# Patient Record
Sex: MALE | Race: WHITE | NOT HISPANIC OR LATINO | URBAN - METROPOLITAN AREA
[De-identification: names, ages, dates, MRNs, and addresses within clinical notes are randomized per-mention and may not be internally consistent; named-entity substitution may affect disease eponyms.]

---

## 2017-01-12 ENCOUNTER — AMBULATORY - HEALTHEAST (OUTPATIENT)
Dept: NURSING | Facility: CLINIC | Age: 32
End: 2017-01-12

## 2017-01-12 DIAGNOSIS — R50.9 FEVER: ICD-10-CM

## 2017-02-13 ENCOUNTER — COMMUNICATION - HEALTHEAST (OUTPATIENT)
Dept: HEALTH INFORMATION MANAGEMENT | Facility: CLINIC | Age: 32
End: 2017-02-13

## 2017-03-24 ENCOUNTER — OFFICE VISIT - HEALTHEAST (OUTPATIENT)
Dept: FAMILY MEDICINE | Facility: CLINIC | Age: 32
End: 2017-03-24

## 2017-03-24 DIAGNOSIS — J01.90 ACUTE SINUSITIS: ICD-10-CM

## 2017-11-06 ENCOUNTER — RECORDS - HEALTHEAST (OUTPATIENT)
Dept: ADMINISTRATIVE | Facility: OTHER | Age: 32
End: 2017-11-06

## 2018-05-07 ENCOUNTER — OFFICE VISIT - HEALTHEAST (OUTPATIENT)
Dept: FAMILY MEDICINE | Facility: CLINIC | Age: 33
End: 2018-05-07

## 2018-05-07 DIAGNOSIS — H00.015 HORDEOLUM EXTERNUM OF LEFT LOWER EYELID: ICD-10-CM

## 2019-01-31 ENCOUNTER — RECORDS - HEALTHEAST (OUTPATIENT)
Dept: ADMINISTRATIVE | Facility: OTHER | Age: 34
End: 2019-01-31

## 2019-03-25 ENCOUNTER — OFFICE VISIT - HEALTHEAST (OUTPATIENT)
Dept: FAMILY MEDICINE | Facility: CLINIC | Age: 34
End: 2019-03-25

## 2019-03-25 DIAGNOSIS — J02.0 ACUTE STREPTOCOCCAL PHARYNGITIS: ICD-10-CM

## 2019-03-25 LAB — DEPRECATED S PYO AG THROAT QL EIA: ABNORMAL

## 2020-08-16 ENCOUNTER — OFFICE VISIT - HEALTHEAST (OUTPATIENT)
Dept: FAMILY MEDICINE | Facility: CLINIC | Age: 35
End: 2020-08-16

## 2020-08-16 DIAGNOSIS — Z20.818 EXPOSURE TO STREP THROAT: ICD-10-CM

## 2020-08-16 LAB — DEPRECATED S PYO AG THROAT QL EIA: NORMAL

## 2020-08-17 ENCOUNTER — COMMUNICATION - HEALTHEAST (OUTPATIENT)
Dept: FAMILY MEDICINE | Facility: CLINIC | Age: 35
End: 2020-08-17

## 2020-08-17 DIAGNOSIS — J02.0 ACUTE STREPTOCOCCAL PHARYNGITIS: ICD-10-CM

## 2020-08-17 LAB — GROUP A STREP BY PCR: ABNORMAL

## 2020-11-30 ENCOUNTER — OFFICE VISIT - HEALTHEAST (OUTPATIENT)
Dept: FAMILY MEDICINE | Facility: CLINIC | Age: 35
End: 2020-11-30

## 2020-11-30 DIAGNOSIS — J02.9 SORE THROAT: ICD-10-CM

## 2020-11-30 LAB — DEPRECATED S PYO AG THROAT QL EIA: NORMAL

## 2020-12-01 ENCOUNTER — COMMUNICATION - HEALTHEAST (OUTPATIENT)
Dept: SCHEDULING | Facility: CLINIC | Age: 35
End: 2020-12-01

## 2020-12-01 LAB — GROUP A STREP BY PCR: NORMAL

## 2021-05-27 VITALS
OXYGEN SATURATION: 96 % | HEART RATE: 65 BPM | DIASTOLIC BLOOD PRESSURE: 70 MMHG | RESPIRATION RATE: 12 BRPM | SYSTOLIC BLOOD PRESSURE: 120 MMHG | TEMPERATURE: 98.3 F

## 2021-05-27 NOTE — PROGRESS NOTES
OFFICE VISIT - FAMILY MEDICINE     ASSESSMENT AND PLAN     1. Acute streptococcal pharyngitis  Rapid Strep A Screen-Throat    azithromycin (ZITHROMAX Z-NADER) 250 MG tablet   Continue with good hydration, extra vitamin C, gargle with salt and water as needed, take the prescribed antibiotic as directed, possible side effect discussed.  Return if not improving.      CHIEF COMPLAINT   Sore Throat (children had strep throat; exposure)    HPI   El Beckett is a 34 y.o. male.  No Patient Care Coordination Note on file.  2 kids at home has been currently being treated for strep infection, patient woke up this morning with sore throat, sensation of swollen gland in the neck, denies any fever chills, slight dry cough.  Has not tried any specific treatment.    Review of Systems As per HPI, otherwise negative.    OBJECTIVE   /40 (Patient Site: Right Arm, Patient Position: Sitting, Cuff Size: Adult Regular)   Pulse 74   Wt 170 lb 8 oz (77.3 kg)   SpO2 100%   Physical Exam   Constitutional: He is oriented to person, place, and time. He appears well-developed and well-nourished.   HENT:   Head: Normocephalic and atraumatic.   Right Ear: External ear normal.   Left Ear: External ear normal.   Mild pharyngeal erythema, 1+ tonsils bilaterally.   Neck: Normal range of motion. Neck supple. No JVD present. No tracheal deviation present. No thyromegaly present.   Cardiovascular: Normal rate, regular rhythm, normal heart sounds and intact distal pulses. Exam reveals no gallop and no friction rub.   No murmur heard.  Pulmonary/Chest: Effort normal and breath sounds normal. No respiratory distress. He has no wheezes. He has no rales.   Musculoskeletal: He exhibits no edema or tenderness.   Lymphadenopathy:     He has no cervical adenopathy.   Neurological: He is alert and oriented to person, place, and time. Coordination normal.   Psychiatric: He has a normal mood and affect. Judgment and thought content normal.       PFSH   No  family history on file.  Social History     Socioeconomic History     Marital status:      Spouse name: Not on file     Number of children: 1     Years of education: Not on file     Highest education level: Not on file   Occupational History     Not on file   Social Needs     Financial resource strain: Not on file     Food insecurity:     Worry: Not on file     Inability: Not on file     Transportation needs:     Medical: Not on file     Non-medical: Not on file   Tobacco Use     Smoking status: Never Smoker     Smokeless tobacco: Never Used   Substance and Sexual Activity     Alcohol use: Not on file     Drug use: Not on file     Sexual activity: Not on file   Lifestyle     Physical activity:     Days per week: Not on file     Minutes per session: Not on file     Stress: Not on file   Relationships     Social connections:     Talks on phone: Not on file     Gets together: Not on file     Attends Yazidism service: Not on file     Active member of club or organization: Not on file     Attends meetings of clubs or organizations: Not on file     Relationship status: Not on file     Intimate partner violence:     Fear of current or ex partner: Not on file     Emotionally abused: Not on file     Physically abused: Not on file     Forced sexual activity: Not on file   Other Topics Concern     Not on file   Social History Narrative     Not on file     Relevant history was reviewed with the patient today, unless noted in HPI, nothing is pertinent for this visit.  [facility]     Patient Active Problem List    Diagnosis Date Noted     Inguinal Hernia      Overview Note:     Created by Conversion    Replacement Utility updated for latest IMO load       Ulcerative Proctitis      Overview Note:     Created by Conversion         Past Surgical History:   Procedure Laterality Date     VT COLONOSCOPY FLX DX W/COLLJ SPEC WHEN PFRMD      Description: Complete Colonoscopy;  Recorded: 03/18/2014;       RESULTS/CONSULTS (Lab/Rad)      Recent Results (from the past 168 hour(s))   Rapid Strep A Screen-Throat   Result Value Ref Range    Rapid Strep A Antigen Group A Strep detected (!) No Group A Strep detected, presumptive negative     No results found.  MEDICATIONS     Current Outpatient Medications on File Prior to Visit   Medication Sig Dispense Refill     betamethasone dipropionate (DIPROLENE) 0.05 % cream APPLY TO AFFECTED AREA TWICE A DAY  3     CANASA 1,000 mg suppository INSERT 1 SUPPOSITORY BY RECTAL ROUTE EVERY DAY AT BEDTIME  11     No current facility-administered medications on file prior to visit.        HEALTH MAINTENANCE / SCREENING   PHQ-2 Total Score: 0 (3/25/2019  9:42 AM)  , No Data Recorded,No Data Recorded  Immunization History   Administered Date(s) Administered     DTaP, historic 10/06/1987, 04/04/1989     Hep B, historic 09/20/2001, 11/15/2001, 03/14/2002     IPV 04/07/1987, 04/04/1989     MMR 10/15/1996     Health Maintenance   Topic     TD 18+ HE      TDAP ADULT ONE TIME DOSE      INFLUENZA VACCINE RULE BASED (1)     ADVANCE DIRECTIVES DISCUSSED WITH PATIENT        [place] Burnett MD  Family Medicine, [facility]     This note was dictated using a voice recognition software.  Any grammatical or context distortion are unintentional and inherent to the software.

## 2021-05-30 VITALS — WEIGHT: 178 LBS

## 2021-06-01 VITALS — WEIGHT: 183.25 LBS

## 2021-06-02 VITALS — WEIGHT: 170.5 LBS

## 2021-06-04 VITALS
WEIGHT: 174.56 LBS | RESPIRATION RATE: 20 BRPM | TEMPERATURE: 98.1 F | SYSTOLIC BLOOD PRESSURE: 117 MMHG | OXYGEN SATURATION: 96 % | DIASTOLIC BLOOD PRESSURE: 65 MMHG | HEART RATE: 74 BPM

## 2021-06-09 NOTE — PROGRESS NOTES
Sinus  One wk sneeze then nose blow lot then clear then 2 days ago changed and malar pressure and teeth pain and slime from [employer] constant.  Last night worst.  Massive pressure pain face.  Bought afrin.   Can breathe but pain and pressure.    No fever.    Ulcerative proctitis with variable control with suppository of canasa...   Back on after trial off.        ROS: as noted above    OBJECTIVE:   Vitals:    03/24/17 1042   BP: 124/62   Pulse: 68   Resp: 20   Temp: 98.8  F (37.1  C)      Head: atraumatic   Eyes: nl eom, anicteric   Ears: nl external ears tms  Neck: nl nodes, supple   Lungs: clear to ausc   Heart: regular rhythm  Back: no tenderness  Abd: soft nontender   Joints: uninflamed   Ext: nontender calves   Mental: euthymic  Neuro: no weakness  Gait: normal  Bilateral sinus tenderness    ASSESSMENT/PLAN:    1. Acute sinusitis  azithromycin (ZITHROMAX) 250 MG tablet    fluticasone (FLONASE) 50 mcg/actuation nasal spray     Anticipate resolution otherwise return.  Return sooner if symptoms worsen.  More than 10 of fifteen total minutes time spent education counseling regarding the issues and care of same as listed in the assessment and plan of this note

## 2021-06-10 NOTE — TELEPHONE ENCOUNTER
Called patient.  Left him a voicemail requesting that he call back through [facility] at his earliest convenience.  When patient returns my call, he needs to be informed of the followin.  Reflex strep testing has returned positive.  2.  A prescription for azithromycin has been sent to [facility].  3.  Recommend use of a probiotic while taking this antibiotic.  4.  He is considered contagious for 24 hours after starting the azithromycin.  5.  He should discard his toothbrush 48 hours after starting the azithromycin to prevent reinfection.    Michael Huddleston MD 20 2:29 PM

## 2021-06-10 NOTE — PROGRESS NOTES
"Walk In Care Note                                                        Date of Visit: 8/16/2020     Chief Complaint   El Beckett is a(n) 35 y.o. White or  male who presents to [facility] with the following complaint(s):  Exposure to strep (son is positive for strep and neg for covid)       Assessment and Plan   1. Exposure to strep throat  - Rapid Strep A Screen-Throat  - Group A Strep, RNA Direct Detection, Throat      Strep screen is negative. Reflex strep testing is in process; will prescribe azithromycin if positive.     Counseled patient regarding assessment and plan for evaluation and treatment. Questions were answered. See AVS for the specific written instructions that were provided at the conclusion of the visit.     Discussed signs / symptoms that warrant urgent / emergent medical attention.     Follow up as needed.      History of Present Illness   Primary symptom: Sore Throat  Onset: Several days ago  Progression: Intermittent  Fevers: No  Chills: No  Sore throat: \"A twinge\"  Dysgeusia: No  Anosmia: No  Nasal congestion: No  Rhinorrhea: No  Sinus pain / pressure: No  Ear pain: No  Headache: No  Body aches: No  Cough: No  Shortness of breath: No  GI symptoms: None  Rash: No  Additional symptoms: None  Home therapies utilized: None  Underlying lung disease: No  Exposure to strep: Yes, son tested positive on 8/14/2020.   Exposure to influenza: No  Exposure to COVID-19: No; son tested negative within the past week.   Other ill contacts: None  Recent travel: No     Review of Systems   Review of Systems   All other systems reviewed and are negative.       Physical Exam   Vitals:    08/16/20 1317   BP: 117/65   Patient Site: Right Arm   Patient Position: Sitting   Cuff Size: Adult Regular   Pulse: 74   Resp: 20   Temp: 98.1  F (36.7  C)   TempSrc: Oral   SpO2: 96%   Weight: 174 lb 9 oz (79.2 kg)     Physical Exam  Vitals signs and nursing note reviewed.   Constitutional:       General: He is not " in acute distress.     Appearance: He is well-developed and normal weight. He is not ill-appearing or toxic-appearing.   HENT:      Head: Normocephalic and atraumatic.      Right Ear: Tympanic membrane, ear canal and external ear normal.      Left Ear: Tympanic membrane, ear canal and external ear normal.      Nose: No mucosal edema or rhinorrhea.      Mouth/Throat:      Mouth: Mucous membranes are moist. No oral lesions.      Pharynx: Uvula midline. No oropharyngeal exudate or posterior oropharyngeal erythema.   Eyes:      General: Lids are normal.      Conjunctiva/sclera: Conjunctivae normal.   Neck:      Musculoskeletal: Neck supple. No edema or erythema.   Cardiovascular:      Rate and Rhythm: Normal rate and regular rhythm.      Heart sounds: S1 normal and S2 normal. No murmur. No friction rub. No gallop.    Pulmonary:      Effort: Pulmonary effort is normal.      Breath sounds: Normal breath sounds. No stridor. No wheezing, rhonchi or rales.   Lymphadenopathy:      Cervical: No cervical adenopathy.   Skin:     General: Skin is warm and dry.      Coloration: Skin is not pale.      Findings: No rash.   Neurological:      General: No focal deficit present.      Mental Status: He is alert and oriented to person, place, and time.          Diagnostic Studies   Laboratory:  Results for orders placed or performed in visit on 08/16/20   Rapid Strep A Screen-Throat    Specimen: Throat   Result Value Ref Range    Rapid Strep A Antigen No Group A Strep detected, presumptive negative No Group A Strep detected, presumptive negative     Radiology:  N/A    Electrocardiogram:  N/A     Procedure Note   N/A     Pertinent History   The following portions of the patient's history were reviewed and updated as appropriate: allergies, current medications, past family history, past medical history, past social history, past surgical history and problem list.    Patient has Inguinal Hernia and Ulcerative Proctitis on their problem  list.    Patient has no past medical history on file.    Patient has a past surgical history that includes pr colonoscopy flx dx w/collj spec when pfrmd.    Patient's family history is not on file.    Patient reports that he has never smoked. He has never used smokeless tobacco.     Portions of this note have been dictated using voice recognition software. Any grammatical or contextual distortions are unintentional and inherent to the software.    Michael Huddleston MD  [facility]

## 2021-06-10 NOTE — PATIENT INSTRUCTIONS - HE
-Rapid strep test is negative.  A confirmatory strep test is in process and will be finalized tomorrow.  -We will only reach out to you if the confirmatory strep test is positive.  An antibiotic will be prescribed if this test is positive.  -Recommend rest, hydration, and alternating doses of over the counter acetaminophen and ibuprofen as needed to manage fever and discomfort.

## 2021-06-13 NOTE — PROGRESS NOTES
OUTPATIENT VISIT NOTE                                                   Date of Visit: 11/30/2020     Chief Complaint   Chief Complaint   Patient presents with     Sore Throat     started yesterday, no fever, slight cough (only when laying down), painful to swollow         History of Present Illness   El Beckett is a 35 y.o. male sore throat since yesterday.  No fever.  Slight cough last night.  Feels like strep to him.  No ear pain.  Mild stuffy nose.  No stomach upset.  No diarrhea.  Normal urination.  Daughter has sore throat.    No known exposure to covid.     Review of Systems   A 10 point comprehensive review of systems was negative except as noted.      MEDICATIONS   Current Outpatient Medications on File Prior to Visit   Medication Sig Dispense Refill     betamethasone dipropionate (DIPROLENE) 0.05 % cream APPLY TO AFFECTED AREA TWICE A DAY  3     CANASA 1,000 mg suppository INSERT 1 SUPPOSITORY BY RECTAL ROUTE EVERY DAY AT BEDTIME  11     No current facility-administered medications on file prior to visit.          SOCIAL HISTORY   Social History     Tobacco Use     Smoking status: Never Smoker     Smokeless tobacco: Never Used   Substance Use Topics     Alcohol use: Not on file           Physical Exam   There were no vitals filed for this visit.     GENERAL:   Alert. Oriented.  EYES: Clear  HENT:  Ears: R TM pearly gray. Normal landmarks. L TM pearly gray.  Normal landmarks  Nose: Clear.  Sinuses: Nontender.  Oropharynx:  No erythema. No exudate.  NECK: Supple. No adenopathy.  LUNGS: Clear to ascultation.  No crackles.  No wheezing  HEART: RRR  SKIN:  No rash.      Diagnostic Studies   LABS:  Results for orders placed or performed in visit on 11/30/20   Rapid Strep A Screen-Throat swab    Specimen: Throat   Result Value Ref Range    Rapid Strep A Antigen No Group A Strep detected, presumptive negative No Group A Strep detected, presumptive negative                 Assessment and Plan   1. Sore throat   Rapid Strep A Screen-Throat swab        Viral pharyngitis.  Test for covid.  Throat sprays or lozenges as needed.  Tylenol or Ibuprofen as needed.  Good fluid intake.  F/U if worsening or not improving.          Counseled regarding assessment and plan for evaluation and treatment. Questions were answered. See AVS for the specific written instructions that were provided at the conclusion of the visit.     Discussed signs / symptoms that warrant urgent / emergent medical attention.     Recheck if worsening or not improving.       Lise Em MD        Pertinent History     The following portions of the patient's history were reviewed and updated as appropriate: allergies, current medications, past family history, past medical history, past social history, past surgical history and problem list.

## 2021-06-14 ENCOUNTER — OFFICE VISIT - HEALTHEAST (OUTPATIENT)
Dept: FAMILY MEDICINE | Facility: CLINIC | Age: 36
End: 2021-06-14

## 2021-06-14 DIAGNOSIS — H44.002 EYE INFECTION, LEFT: ICD-10-CM

## 2021-06-14 RX ORDER — ERYTHROMYCIN 5 MG/G
OINTMENT OPHTHALMIC
Qty: 1 TUBE | Refills: 1 | Status: SHIPPED | OUTPATIENT
Start: 2021-06-14 | End: 2021-06-24

## 2021-06-14 ASSESSMENT — PATIENT HEALTH QUESTIONNAIRE - PHQ9: SUM OF ALL RESPONSES TO PHQ QUESTIONS 1-9: 0

## 2021-06-17 NOTE — PROGRESS NOTES
Assessment & Plan   1. Hordeolum externum of left lower eyelid:  Appearance consistent with Stye.  Given reported associated eyelid edema and erythema as recently as yesterday did recommend treatment with oral antibiotic, however he declines stating he would rather avoid this.  Counseled on potential risks of spreading infection.  Will treat with warm compresses. He is advised to seek care if symptoms worsen.     Shahana Espana CNP    Subjective   Chief Complaint:  Eye Problem (started off swollen and itchy in the L eye about 1 week ago, over the weekend it started to become painful and felt an actual bump, sensitive to light now, not painful, can feel pressure )    HPI:   El Beckett is a 33 y.o. male who presents for left eye     He states eye symptoms began one week ago.  Initially noted swelling and erythema of the left lower lid.  No known injury or foreign object to the eye.  This continued for a week and now more localized inflammation of the lower eyelid.  Mildly tender. Eye itself is not uncomfortable.  No changes in vision. No drainage or crusting.       Allergies:  is allergic to amoxicillin.    SH/FH:  Social History and Family History reviewed and updated.   Tobacco Status:  He  reports that he has never smoked. He has never used smokeless tobacco.    Review of Systems:  A complete head to toe ROS is negative unless otherwise noted in HPI    Objective     Vitals:    05/07/18 0930   BP: 110/62   Patient Site: Right Arm   Patient Position: Sitting   Cuff Size: Adult Large   Pulse: (!) 54   SpO2: 98%   Weight: 183 lb 4 oz (83.1 kg)       Physical Exam:  GENERAL: Alert, well-appearing male  EYES: Conjunctiva pink, sclera white, no exudates. Left lower inner lid with localized round, 2-3mm swelling consistent with hordeolum.  TINY.  EOMs intact. Corneal light reflex bilaterally, red reflex present.Undilated fundoscopic exam normal.

## 2021-06-20 ENCOUNTER — HEALTH MAINTENANCE LETTER (OUTPATIENT)
Age: 36
End: 2021-06-20

## 2021-06-26 ENCOUNTER — RECORDS - HEALTHEAST (OUTPATIENT)
Dept: ADMINISTRATIVE | Facility: OTHER | Age: 36
End: 2021-06-26

## 2021-06-26 NOTE — PROGRESS NOTES
El Beckett is a 36 y.o. male who is being evaluated via a billable video visit.      How would you like to obtain your AVS? MyChart.  If dropped from the video visit, the video invitation should be resent by: Send to e-mail at: [email or web address]  Will anyone else be joining your video visit? No      Video Start Time: 11:25 am    Assessment & Plan     Eye infection, left    - erythromycin ophthalmic ointment; 1 cm ribbon OPHTHALMIC ointment applied up to 4 times daily x 5-7 days  Left upper eyelid stye with mild conjunctivitis, continue with heating pad as tolerated, erythromycin ointment prescribed to use as directed, consider referral to ophthalmology if getting bigger or not improving.  Review of external notes as documented in note  13 minutes spent on the date of the encounter doing chart review, review of outside records, review of test results, interpretation of tests, patient visit and documentation        No follow-ups on file.    Jenna Burnett MD  [facility]   El Beckett is 36 y.o. and presents today for the following health issues   HPI   Left upper eyelid infection for the past 4 days, sensation of a tiny nodule, nontender, mild redness at the conjunctiva area, has been using hot pack with no significant improvement, vision does not seems to be affected.  Has had similar infection at the right upper eyelid few years ago, stye was drained by ophthalmologist.  Planning to travel out of town for the next few days.  Immunization reviewed and planning to update in the fall.      Review of Systems  As per HPI otherwise negative.      Objective       Vitals:  No vitals were obtained today due to virtual visit.    Physical Exam  Thought process and language is adequate no apparent distress left upper eyelid and conjunctival redness.          Video-Visit Details    Type of service:  Video Visit    Video End Time (time video stopped): 11:49  MARIAN  Originating Location (pt. Location): Home    Distant Location (provider location):  [facility]     Platform used for Video Visit: Prabha

## 2021-07-06 ASSESSMENT — PATIENT HEALTH QUESTIONNAIRE - PHQ9: SUM OF ALL RESPONSES TO PHQ QUESTIONS 1-9: 0

## 2021-10-11 ENCOUNTER — HEALTH MAINTENANCE LETTER (OUTPATIENT)
Age: 36
End: 2021-10-11

## 2021-11-08 ENCOUNTER — TRANSFERRED RECORDS (OUTPATIENT)
Dept: HEALTH INFORMATION MANAGEMENT | Facility: CLINIC | Age: 36
End: 2021-11-08

## 2021-12-30 ENCOUNTER — TRANSFERRED RECORDS (OUTPATIENT)
Dept: HEALTH INFORMATION MANAGEMENT | Facility: CLINIC | Age: 36
End: 2021-12-30

## 2022-03-03 ENCOUNTER — VIRTUAL VISIT (OUTPATIENT)
Dept: INTERNAL MEDICINE | Facility: CLINIC | Age: 37
End: 2022-03-03
Payer: COMMERCIAL

## 2022-03-03 DIAGNOSIS — Z00.00 ENCOUNTER FOR PREVENTATIVE ADULT HEALTH CARE EXAMINATION: Primary | ICD-10-CM

## 2022-03-03 PROCEDURE — 99207 PR NO CHARGE LOS: CPT

## 2022-03-03 RX ORDER — ACETAMINOPHEN 325 MG/1
325-650 TABLET ORAL EVERY 8 HOURS PRN
COMMUNITY

## 2022-03-03 NOTE — PROGRESS NOTES
Health Maintenance:  Do you have a PCP? No  When was your last visit with your PCP?   When was your last eye exam?   Have you ever had a colonoscopy? Yes   If yes, when? 2021  Have you ever had any polyps removed? No    As part of your visit we will set up a DEXA scan which will measure your body composition. We have a few questions that need to be answered before we can schedule this scan:   What is your approximate weight? 180   Have you ever had a DEXA scan within the past 2 years? No   Will you have any other imaging studies with contrast (x-ray, CT scan) within 7 days of this appointment? No   Have you had any spine or hip surgery? No   Do you take any vitamins that contain calcium or antacids with calcium? No    If yes, stop taking 24 hours prior to visit.     Goals for the Visit:  1. Thorough Comprehensive Preventive Exam  2.  3.   Pertinent past Medical/Family and Social HX:   Pertinent sx that desire are addressed with this visit:     Instructions prior to appointment:   1. Fast beginning at 10 pm for lab appointment  2. If your preventive care assessment package includes a Fitness Assessment, please bring athletic shoes. Complementary [employer] [facility] fitness attire is provided and yours to keep.  3. If eye exam, eyes may be dilated, it will last 4-6 hours, may want to bring sunglasses.   4. May bring laptop or other work materials for use during downtime.   5. You will receive an email about 3 days prior to your visit with a final itinerary, menu selections for the complementary breakfast and lunch and instructions for the visit.     Complimentary  Parking provided. Drop off car in front of [facility], take the patient elevators to the [facility]. When you enter in the lobby, identify yourself as an [facility] [atient and you will be escorted up to the clinic.   If questions arise prior to your appointment please contact the clinic at  [phone].

## 2022-03-08 ENCOUNTER — OFFICE VISIT (OUTPATIENT)
Dept: INTERNAL MEDICINE | Facility: CLINIC | Age: 37
End: 2022-03-08
Payer: COMMERCIAL

## 2022-03-08 ENCOUNTER — OFFICE VISIT (OUTPATIENT)
Dept: OPHTHALMOLOGY | Facility: CLINIC | Age: 37
End: 2022-03-08
Payer: COMMERCIAL

## 2022-03-08 ENCOUNTER — APPOINTMENT (OUTPATIENT)
Dept: INTERNAL MEDICINE | Facility: CLINIC | Age: 37
End: 2022-03-08
Payer: COMMERCIAL

## 2022-03-08 ENCOUNTER — OFFICE VISIT (OUTPATIENT)
Dept: DERMATOLOGY | Facility: CLINIC | Age: 37
End: 2022-03-08
Payer: COMMERCIAL

## 2022-03-08 ENCOUNTER — OFFICE VISIT (OUTPATIENT)
Dept: AUDIOLOGY | Facility: CLINIC | Age: 37
End: 2022-03-08
Payer: COMMERCIAL

## 2022-03-08 ENCOUNTER — ANCILLARY PROCEDURE (OUTPATIENT)
Dept: BONE DENSITY | Facility: CLINIC | Age: 37
End: 2022-03-08
Payer: COMMERCIAL

## 2022-03-08 VITALS
OXYGEN SATURATION: 97 % | TEMPERATURE: 98.3 F | RESPIRATION RATE: 16 BRPM | WEIGHT: 181 LBS | SYSTOLIC BLOOD PRESSURE: 109 MMHG | BODY MASS INDEX: 23.23 KG/M2 | DIASTOLIC BLOOD PRESSURE: 73 MMHG | HEIGHT: 74 IN | HEART RATE: 87 BPM

## 2022-03-08 VITALS — BODY MASS INDEX: 23.86 KG/M2 | WEIGHT: 180 LBS | HEIGHT: 73 IN

## 2022-03-08 DIAGNOSIS — Z00.00 ENCOUNTER FOR PREVENTATIVE ADULT HEALTH CARE EXAMINATION: ICD-10-CM

## 2022-03-08 DIAGNOSIS — Z71.82 EXERCISE COUNSELING: Primary | ICD-10-CM

## 2022-03-08 DIAGNOSIS — F43.9 STRESS: ICD-10-CM

## 2022-03-08 DIAGNOSIS — L81.4 LENTIGINES: ICD-10-CM

## 2022-03-08 DIAGNOSIS — L82.1 SEBORRHEIC KERATOSIS: Primary | ICD-10-CM

## 2022-03-08 DIAGNOSIS — E55.9 VITAMIN D DEFICIENCY: ICD-10-CM

## 2022-03-08 DIAGNOSIS — D22.9 MULTIPLE BENIGN NEVI: ICD-10-CM

## 2022-03-08 DIAGNOSIS — D18.01 CHERRY ANGIOMA: ICD-10-CM

## 2022-03-08 DIAGNOSIS — H92.03 OTALGIA OF BOTH EARS: Primary | ICD-10-CM

## 2022-03-08 DIAGNOSIS — Z00.00 ENCOUNTER FOR PREVENTATIVE ADULT HEALTH CARE EXAMINATION: Primary | ICD-10-CM

## 2022-03-08 DIAGNOSIS — H52.13 MYOPIA, BILATERAL: Primary | ICD-10-CM

## 2022-03-08 DIAGNOSIS — D48.9 NEOPLASM OF UNCERTAIN BEHAVIOR: ICD-10-CM

## 2022-03-08 DIAGNOSIS — F41.9 ANXIETY: ICD-10-CM

## 2022-03-08 LAB
ALP SERPL-CCNC: 52 U/L (ref 40–150)
ALT SERPL W P-5'-P-CCNC: 30 U/L (ref 0–70)
BASOPHILS # BLD AUTO: 0 10E3/UL (ref 0–0.2)
BASOPHILS NFR BLD AUTO: 1 %
CHOLEST SERPL-MCNC: 187 MG/DL
CREAT SERPL-MCNC: 0.94 MG/DL (ref 0.66–1.25)
DEPRECATED CALCIDIOL+CALCIFEROL SERPL-MC: 18 UG/L (ref 20–75)
EOSINOPHIL # BLD AUTO: 0.1 10E3/UL (ref 0–0.7)
EOSINOPHIL NFR BLD AUTO: 1 %
ERYTHROCYTE [DISTWIDTH] IN BLOOD BY AUTOMATED COUNT: 11.6 % (ref 10–15)
FASTING STATUS PATIENT QL REPORTED: YES
FASTING STATUS PATIENT QL REPORTED: YES
GFR SERPL CREATININE-BSD FRML MDRD: >90 ML/MIN/1.73M2
GLUCOSE BLD-MCNC: 100 MG/DL (ref 70–99)
HCT VFR BLD AUTO: 44.8 % (ref 40–53)
HCV AB SERPL QL IA: NONREACTIVE
HDLC SERPL-MCNC: 44 MG/DL
HGB BLD-MCNC: 15.3 G/DL (ref 13.3–17.7)
HIV 1+2 AB+HIV1 P24 AG SERPL QL IA: NONREACTIVE
HOLD SPECIMEN: NORMAL
HOLD SPECIMEN: NORMAL
IMM GRANULOCYTES # BLD: 0 10E3/UL
IMM GRANULOCYTES NFR BLD: 0 %
LDLC SERPL CALC-MCNC: 124 MG/DL
LYMPHOCYTES # BLD AUTO: 2.4 10E3/UL (ref 0.8–5.3)
LYMPHOCYTES NFR BLD AUTO: 36 %
MCH RBC QN AUTO: 30.5 PG (ref 26.5–33)
MCHC RBC AUTO-ENTMCNC: 34.2 G/DL (ref 31.5–36.5)
MCV RBC AUTO: 89 FL (ref 78–100)
MONOCYTES # BLD AUTO: 0.5 10E3/UL (ref 0–1.3)
MONOCYTES NFR BLD AUTO: 8 %
NEUTROPHILS # BLD AUTO: 3.5 10E3/UL (ref 1.6–8.3)
NEUTROPHILS NFR BLD AUTO: 54 %
NONHDLC SERPL-MCNC: 143 MG/DL
NRBC # BLD AUTO: 0 10E3/UL
NRBC BLD AUTO-RTO: 0 /100
PLATELET # BLD AUTO: 305 10E3/UL (ref 150–450)
RBC # BLD AUTO: 5.01 10E6/UL (ref 4.4–5.9)
TRIGL SERPL-MCNC: 96 MG/DL
TSH SERPL DL<=0.005 MIU/L-ACNC: 1.46 MU/L (ref 0.4–4)
WBC # BLD AUTO: 6.5 10E3/UL (ref 4–11)

## 2022-03-08 PROCEDURE — 85025 COMPLETE CBC W/AUTO DIFF WBC: CPT | Performed by: PATHOLOGY

## 2022-03-08 PROCEDURE — 96999 UNLISTED SPEC DERM SVC/PX: CPT | Performed by: INTERNAL MEDICINE

## 2022-03-08 PROCEDURE — 99207 PR NO CHARGE LOS: CPT

## 2022-03-08 PROCEDURE — 84075 ASSAY ALKALINE PHOSPHATASE: CPT | Performed by: PATHOLOGY

## 2022-03-08 PROCEDURE — 92550 TYMPANOMETRY & REFLEX THRESH: CPT | Performed by: AUDIOLOGIST

## 2022-03-08 PROCEDURE — 84443 ASSAY THYROID STIM HORMONE: CPT | Performed by: PATHOLOGY

## 2022-03-08 PROCEDURE — 92004 COMPRE OPH EXAM NEW PT 1/>: CPT | Performed by: OPHTHALMOLOGY

## 2022-03-08 PROCEDURE — 77080 DXA BONE DENSITY AXIAL: CPT | Performed by: INTERNAL MEDICINE

## 2022-03-08 PROCEDURE — 94060 EVALUATION OF WHEEZING: CPT | Performed by: INTERNAL MEDICINE

## 2022-03-08 PROCEDURE — 86803 HEPATITIS C AB TEST: CPT | Performed by: INTERNAL MEDICINE

## 2022-03-08 PROCEDURE — 80061 LIPID PANEL: CPT | Performed by: PATHOLOGY

## 2022-03-08 PROCEDURE — 93010 ELECTROCARDIOGRAM REPORT: CPT | Performed by: INTERNAL MEDICINE

## 2022-03-08 PROCEDURE — 36415 COLL VENOUS BLD VENIPUNCTURE: CPT | Performed by: PATHOLOGY

## 2022-03-08 PROCEDURE — 82565 ASSAY OF CREATININE: CPT | Performed by: PATHOLOGY

## 2022-03-08 PROCEDURE — 11102 TANGNTL BX SKIN SINGLE LES: CPT | Performed by: DERMATOLOGY

## 2022-03-08 PROCEDURE — 82947 ASSAY GLUCOSE BLOOD QUANT: CPT | Performed by: PATHOLOGY

## 2022-03-08 PROCEDURE — 92557 COMPREHENSIVE HEARING TEST: CPT | Performed by: AUDIOLOGIST

## 2022-03-08 PROCEDURE — 99203 OFFICE O/P NEW LOW 30 MIN: CPT | Mod: 25 | Performed by: DERMATOLOGY

## 2022-03-08 PROCEDURE — 82306 VITAMIN D 25 HYDROXY: CPT | Performed by: INTERNAL MEDICINE

## 2022-03-08 PROCEDURE — 88305 TISSUE EXAM BY PATHOLOGIST: CPT | Mod: TC | Performed by: DERMATOLOGY

## 2022-03-08 PROCEDURE — 99385 PREV VISIT NEW AGE 18-39: CPT | Performed by: INTERNAL MEDICINE

## 2022-03-08 PROCEDURE — 88305 TISSUE EXAM BY PATHOLOGIST: CPT | Mod: 26 | Performed by: DERMATOLOGY

## 2022-03-08 PROCEDURE — 84460 ALANINE AMINO (ALT) (SGPT): CPT | Performed by: PATHOLOGY

## 2022-03-08 PROCEDURE — 87389 HIV-1 AG W/HIV-1&-2 AB AG IA: CPT | Performed by: INTERNAL MEDICINE

## 2022-03-08 ASSESSMENT — REFRACTION_MANIFEST
OD_SPHERE: -0.25
OS_SPHERE: -0.25
OD_CYLINDER: SPHERE
OS_CYLINDER: SPHERE

## 2022-03-08 ASSESSMENT — VISUAL ACUITY
OD_SC: 20/20
OS_SC: 20/20
OS_SC+: -1
METHOD: SNELLEN - LINEAR
OD_SC: J1+
OS_SC: J1+
OD_SC+: -1

## 2022-03-08 ASSESSMENT — CONF VISUAL FIELD
OS_NORMAL: 1
METHOD: COUNTING FINGERS
OD_NORMAL: 1

## 2022-03-08 ASSESSMENT — EXTERNAL EXAM - LEFT EYE: OS_EXAM: NORMAL

## 2022-03-08 ASSESSMENT — TONOMETRY
OD_IOP_MMHG: 14
IOP_METHOD: ICARE
OS_IOP_MMHG: 15

## 2022-03-08 ASSESSMENT — PAIN SCALES - GENERAL
PAINLEVEL: NO PAIN (0)
PAINLEVEL: NO PAIN (0)

## 2022-03-08 ASSESSMENT — CUP TO DISC RATIO
OS_RATIO: 0.1
OD_RATIO: 0.1

## 2022-03-08 ASSESSMENT — EXTERNAL EXAM - RIGHT EYE: OD_EXAM: NORMAL

## 2022-03-08 ASSESSMENT — SLIT LAMP EXAM - LIDS
COMMENTS: NORMAL
COMMENTS: NORMAL

## 2022-03-08 NOTE — NURSING NOTE
Chief Complaints and History of Present Illnesses   Patient presents with     COMPREHENSIVE EYE EXAM     No vision issues each eye.     Chief Complaint(s) and History of Present Illness(es)     COMPREHENSIVE EYE EXAM     Laterality: both eyes    Associated symptoms: Negative for eye pain, redness, tearing, discharge and swelling    Treatments tried: no treatments    Pain scale: 0/10    Comments: No vision issues each eye.              Comments     Intermittent sty seem to appear on KATHRYN on and off and has resolved on its own. Seem to bother when tired seem to flare up. Will first begin to itch. No bump present now but some itching of lids the past few days.   Distance not as crisp with each eye when was younger but feel vision is still good both distance and near each eye.     Selene Amador, COT COT 8:19 AM March 8, 2022

## 2022-03-08 NOTE — PATIENT INSTRUCTIONS
Patient Education     Checking for Skin Cancer  You can find cancer early by checking your skin each month. There are 3 kinds of skin cancer. They are melanoma, basal cell carcinoma, and squamous cell carcinoma. Doing monthly skin checks is the best way to find new marks or skin changes. Follow the instructions below for checking your skin.   The ABCDEs of checking moles for melanoma   Check your moles or growths for signs of melanoma using ABCDE:     Asymmetry: the sides of the mole or growth don t match    Border: the edges are ragged, notched, or blurred    Color: the color within the mole or growth varies    Diameter: the mole or growth is larger than 6 mm (size of a pencil eraser)    Evolving: the size, shape, or color of the mole or growth is changing (evolving is not shown in the images below)    Checking for other types of skin cancer  Basal cell carcinoma or squamous cell carcinoma have symptoms such as:       A spot or mole that looks different from all other marks on your skin    Changes in how an area feels, such as itching, tenderness, or pain    Changes in the skin's surface, such as oozing, bleeding, or scaliness    A sore that does not heal    New swelling or redness beyond the border of a mole    Who s at risk?  Anyone can get skin cancer. But you are at greater risk if you have:     Fair skin, light-colored hair, or light-colored eyes    Many moles or abnormal moles on your skin    A history of sunburns from sunlight or tanning beds    A family history of skin cancer    A history of exposure to radiation or chemicals    A weakened immune system  If you have had skin cancer in the past, you are at risk for recurring skin cancer.   How to check your skin  Do your monthly skin checkups in front of a full-length mirror. Check all parts of your body, including your:     Head (ears, face, neck, and scalp)    Torso (front, back, and sides)    Arms (tops, undersides, upper, and lower armpits)    Hands  (palms, backs, and fingers, including under the nails)    Buttocks and genitals    Legs (front, back, and sides)    Feet (tops, soles, toes, including under the nails, and between toes)  If you have a lot of moles, take digital photos of them each month. Make sure to take photos both up close and from a distance. These can help you see if any moles change over time.   Most skin changes are not cancer. But if you see any changes in your skin, call your doctor right away. Only he or she can diagnose a problem. If you have skin cancer, seeing your doctor can be the first step toward getting the treatment that could save your life.   [employer]ruly Â® last reviewed this educational content on 4/1/2019 2000-2020 [employer]. [address] [place] [address]. All rights reserved. This information is not intended as a substitute for professional medical care. Always follow your healthcare professional's instructions.       When should I call my doctor?    If you are worsening or not improving, please, contact us or seek urgent care as noted below.     Who should I call with questions (adults)?    [facility] (adult and pediatric): [phone]    [facility] (adult): [phone]    For urgent needs outside of business hours call the [facility] at [phone] and ask for the dermatology resident on call to be paged    If this is a medical emergency and you are unable to reach an ER, Call [phone]    Who should I call with questions (pediatric)?  [facility]- Pediatric Dermatology  Dr. Elen Nolasco, Dr. Thomas Quintero, Dr. Olga Glez, BRETT Langford, Dr. Maday Guzman, Dr. Amna Smith & Dr. Danie Echavarria  Non-urgent nurse triage line; [phone]- Alexsandra and Adriana GUEVARA Care Coordinatorkristin Hawkins (/Complex ) [phone]    If you need a prescription refill, please contact your  pharmacy. Refills are approved or denied by our Physicians during normal business hours, Monday through Fridays  Per office policy, refills will not be granted if you have not been seen within the past year (or sooner depending on your child's condition)    Scheduling Information:  Pediatric Appointment Scheduling and Call Center [phone]  Radiology Scheduling- [phone]  Sedation Unit Scheduling- [phone]  [place] Scheduling- General [phone]; Pediatric Dermatology [phone]  Main  Services: [phone]  S[place]an: [phone]  Comoran: [phone]  Hmong/Prydeinig/David: [phone]  Preadmission Nursing Department Fax Number: [phone] (Fax all pre-operative paperwork to this number)    For urgent matters arising during evenings, weekends, or holidays that cannot wait for normal business hours please call [phone] and ask for the dermatology resident on call to be paged.      Wound Care After a Biopsy    What is a skin biopsy?  A skin biopsy allows the doctor to examine a very small piece of tissue under the microscope to determine the diagnosis and the best treatment for the skin condition. A local anesthetic (numbing medicine)  is injected with a very small needle into the skin area to be tested. A small piece of skin is taken from the area. Sometimes a suture (stitch) is used.     What are the risks of a skin biopsy?  I will experience scar, bleeding, swelling, pain, crusting and redness. I may experience incomplete removal or recurrence. Risks of this procedure are excessive bleeding, bruising, infection, nerve damage, numbness, thick (hypertrophic or keloidal) scar and non-diagnostic biopsy.    How should I care for my wound for the first 24 hours?    Keep the wound dry and covered for 24 hours    If it bleeds, hold direct pressure on the area for 15 minutes. If bleeding does not stop then go to the emergency room    Avoid strenuous exercise the first 1-2 days or as your  doctor instructs you    How should I care for the wound after 24 hours?    After 24 hours, remove the bandage    You may bathe or shower as normal    If you had a scalp biopsy, you can shampoo as usual and can use shower water to clean the biopsy site daily    Clean the wound twice a day with gentle soap and water    Do not scrub, be gentle    Apply white petroleum/Vaseline after cleaning the wound with a cotton swab or a clean finger, and keep the site covered with a Bandaid /bandage. Bandages are not necessary with a scalp biopsy    If you are unable to cover the site with a Bandaid /bandage, re-apply ointment 2-3 times a day to keep the site moist. Moisture will help with healing    Avoid strenuous activity for first 1-2 days    Avoid lakes, rivers, pools, and oceans until the stitches are removed or the site is healed    How do I clean my wound?    Wash hands thoroughly with soap or use hand  before all wound care    Clean the wound with gentle soap and water    Apply white petroleum/Vaseline  to wound after it is clean    Replace the Bandaid /bandage to keep the wound covered for the first few days or as instructed by your doctor    If you had a scalp biopsy, warm shower water to the area on a daily basis should suffice    What should I use to clean my wound?     Cotton-tipped applicators (Qtips )    White petroleum jelly (Vaseline ). Use a clean new container and use Q-tips to apply.    Bandaids   as needed    Gentle soap     How should I care for my wound long term?    Do not get your wound dirty    Keep up with wound care for one week or until the area is healed.    A small scab will form and fall off by itself when the area is completely healed. The area will be red and will become pink in color as it heals. Sun protection is very important for how your scar will turn out. Sunscreen with an SPF 30 or greater is recommended once the area is healed.    You should have some soreness but it should be  mild and slowly go away over several days. Talk to your doctor about using tylenol for pain,    When should I call my doctor?  If you have increased:     Pain or swelling    Pus or drainage (clear or slightly yellow drainage is ok)    Temperature over 100F    Spreading redness or warmth around wound    When will I hear about my results?  The biopsy results can take 2 weeks to come back.  Your results will automatically release to [employer] before your provider has even reviewed them.  The clinic will call you with the results, send you a [employer] message, or have you schedule a follow-up clinic or phone time to discuss the results.  Contact our clinics if you do not hear from us in 2 weeks.    Who should I call with questions?    [facility]: [phone]    [facility]: [phone]    For urgent needs outside of business hours call the [facility] at [phone] and ask for the dermatology resident on call

## 2022-03-08 NOTE — NURSING NOTE
AHA BP    1st    114/72  2nd   109/71  3rd    109/73    Average    111/72  Faith Ricketts CMA 7:37 AM on 3/8/2022

## 2022-03-08 NOTE — NURSING NOTE
Chief Complaint   Patient presents with     Physical     Patient is here for annual physical     Faith Ricketts CMA 7:11 AM on 3/8/2022.

## 2022-03-08 NOTE — LETTER
3/8/2022      RE: El Beckett  [address]  [place] [address]       Physical Fitness Assessment:    See Fitness Action Plan for information.    Emanuel Soriano, PhD.  Exercise Physiologist  Fitness       Emanuel Soriano, PhD

## 2022-03-08 NOTE — LETTER
Date:March 9, 2022      Patient was self referred, no letter generated. Do not send.        [facility] Health Information

## 2022-03-08 NOTE — PROGRESS NOTES
[facility] Dermatology Note  Encounter Date: Mar 8, 2022  Office Visit     Dermatology Problem List:  # NUB, R paraspinal back, ddx atypical nevus r/o melanoma, s/p shave bx 3/8/11  ____________________________________________    Assessment & Plan:    # NUB, R paraspinal back, ddx atypical nevus r/o melanoma  - Shave biopsy today, see procedure note below.     # Benign lesions: Multiple benign nevi, solar lentigos, seborrheic keratoses, cherry angiomas. Explained to patient benign nature of lesion. No treatment is necessary at this time unless the lesion changes or becomes symptomatic.     - ABCDs of melanoma were discussed and self skin checks were advised.  - Sun precaution was advised including the use of sun screens of SPF 30 or higher, sun protective clothing, and avoidance of tanning beds.    Procedures Performed:   - Shave biopsy procedure note, location(s): See above. After discussion of benefits and risks including but not limited to bleeding, infection, scar, incomplete removal, recurrence, and non-diagnostic biopsy, written consent and photographs were obtained. The area was cleaned with isopropyl alcohol. 0.5mL of 1% lidocaine with epinephrine was injected to obtain adequate anesthesia of lesion(s). Shave biopsy at site(s) performed. Hemostasis was achieved with aluminium chloride. Petrolatum ointment and a sterile dressing were applied. The patient tolerated the procedure and no complications were noted. The patient was provided with verbal and written post care instructions.     Follow-up: pending path results    Staff and Scribe:     Scribe Disclosure:  Millicent GUILLORY, am serving as a scribe to document services personally performed by Alex Maciel MD based on data collection and the provider's statements to me.     Provider Disclosure:   The documentation recorded by the scribe accurately reflects the services I personally performed and the decisions made by  me.    Alex Maciel MD    Department of Dermatology  [facility]: Phone: [phone], Fax:[phone]  [facility]: Phone: [phone] Fax: [phone]    ____________________________________________    CC: Skin Check (full body skin check )    HPI:  Mr. El Beckett is a(n) 37 year old male who presents today as a new patient for [facility].     The patient otherwise denies any new or concerning lesions. No bleeding, painful, pruritic, or changing lesions. He's had a few moles removed from the back before, but none were concerning. They report no personal history of skin cancer or atypical nevi. There is no family history of skin cancer or atypical nevi. No history of immunosuppression. No history of indoor tanning. They do use sunscreen and protective clothing when outdoors for sun protection. In the sun he will both burn and tan. No occupational exposure to ultraviolet light or other forms of radiation. Patient works indoors at an office job. Health otherwise stable. No other skin concerns.     Labs Reviewed:  N/A    Physical Exam:  Vitals: There were no vitals taken for this visit.  SKIN: Full skin, which includes the head/face, both arms, chest, back, abdomen,both legs, genitalia and/or groin buttocks, digits and/or nails, was examined.  - On the R paraspinal back, there is a 4 mm brown macules with asymmetric disorganized reticular network with speckled pigment forming rhomboidal structures.   - There are dome shaped bright red papules on the trunk and extremities.   - Multiple regular brown pigmented macules and papules are identified on the trunk and extremities.   - Scattered brown macules on sun exposed areas.  - There are waxy stuck on tan to brown papules on the trunk and extremities.   - No other lesions of concern on areas examined.         Medications:  Current Outpatient  Medications   Medication     acetaminophen (TYLENOL) 325 MG tablet     No current facility-administered medications for this visit.      Past Medical History:   Patient Active Problem List   Diagnosis     Inguinal Hernia     Ulcerative Proctitis     No past medical history on file.

## 2022-03-08 NOTE — LETTER
"3/8/2022      RE: El Beckett  [address] [place] [address]        History and Physical Examination     SUBJECTIVE: Chief concern: preventive health review.     Past Medical History:  1.  Ulcerative colitis; presented in 3/2014 with rectal bleeding and fecal urgency; colonoscopy that time showed ulcerative proctitis with cecal patch; intermittent flares thereafter, initially treated with mesalamine suppositories; no flares since changing to an organic, non-processed diet; 12/2021.  Colonoscopy showed single aphthous erosion of the ileum with decreased rectal vascularity, external hemorrhoids, pedunculated rectal polyp, and sessile polyp of the descending colon; diagnosed as \"ulcerative colitis limited to rectum and not active\".  2.  Status post bilateral inguinal herniorrhaphy.  3.  Nasal fracture, age 1     Adverse Drug Reactions: Amoxicillin (urticaria)     Current Medications: Acetaminophen, as needed     Habits:  Tobacco: Chewing tobacco, less than 1 tin per week  Alcohol: 1-2 servings per day  Caffeine: 2-3 servings of coffee per day  Street drugs: Infrequent use of marijuana     Social History:  to Amna in 2011 and father of 2 children: son Chicho, age 8, a second grader; and daughter Sharri, age 4.  El was born in [place] and moved to [place], [place] before high school.  He played soccer through college, attending the [employer], where he studied marketing.  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  Away from work, he enjoys family activities, fishing, hunting, and golf.  He does not formally exercise at this time.    Family History:  Mother is overweight in her upper 60s.  Father is in his late 60s, with history of colonic polyps and possible inflammatory bowel disease.  A brother 9 " years his senior has type 2 diabetes mellitus and history of viral encephalitis and alcohol misuse.  A brother 6 years his senior has a history of drug and alcohol abuse.  A brother one year his senior is in good health.  A brother 5 years his waqar is overweight.  Children are in good health.  Paternal grandfather may have had colon cancer and  from renal and hepatic failure with history of alcohol abuse.  Paternal grandmother  in her 90s, with history of tobacco-related COPD.  Maternal grandfather  in his 70s; medical history not known.  Maternal grandmother  at age 96, with history of bladder cancer and tobacco abuse.      Review of Systems: Significant stress related to the rapid growth of his company and recent decisions to update systems software and increase hiring.  Colonoscopy was completed in 2021.  Covid vaccinations were administered on , , and 2021.  Most recent tetanus (Tdap) vaccination was administered in 2021.  Hepatitis B vaccination series was completed in 3/2002.  Remainder of complete review of systems was negative.     OBJECTIVE:     Vital signs: Height 73.7 inches.  Weight 181 pounds.  Blood pressure 111/72 on average of 3 automated readings.  Heart rate 87.  Respiratory rate 16.  Temperature 98.3 degrees.  O2 saturation 97% on room air.  General: Alert, neatly dressed and groomed, in no acute distress.  HEENT: Atraumatic and normocephalic. Eyelids, pupils, and conjunctivae appeared normal. Lips, teeth and gums appear normal.  Oropharynx showed moist mucous membranes, without exudate or erythema.  Neck: Supple, without thyromegaly, mass, or bruit. No cervical or supraclavicular lymphadenopathy.  Back: No spinal or costovertebral angle tenderness.  Chest: Clear to auscultation and percussion. Normal respiratory effort.  Cardiovascular: No jugular venous distention. Regular rate and rhythm, normal S1, S2 without murmur.  Abdomen: Bowel sounds positive; soft,  "nontender, without rebound, guarding, hepatosplenomegaly or mass.  Extremities: No cyanosis or edema.  Genitalia: Normal male genitalia, without scrotal mass or hernia. No inguinal lymphadenopathy.  Skin: Examination was deferred; full evaluation was completed.  Later in day through dermatology clinic.  Neurologic: Cranial nerves II-XII were grossly intact. Sensory and motor examinations were normal. Normal gait.  Mini-cog score was 5/5.  Psychiatric: Alert and oriented ×3. Normal affect. Judgment and insight intact.  BEAN-7 score was 12.  PHQ-2 score was 1.    Creatinine 0.94, alkaline phosphatase 52, ALT 30, cholesterol 187, HDL 44, , triglycerides 96, cholesterol/HDL 4.0, TSH 1.46, 25-hydroxyvitamin D 18, glucose 100, white blood cell count 6500, hemoglobin 15.3, platelets 305,000, hepatitis C and HIV screen was nonreactive.    EKG was notable only for changes of early repolarization.  Spirometry showed an FEV1 of 4.98, with an FVC of 6.96; readings were 104% and 116% of predicted values, respectively.    DEXA showed normal bone density, with most negative and valid Z-score of -0.3 at the level of the left femoral neck.  Body composition analysis showed 19.0% fat (36th percentile); body mass index was 23.4.     ASSESSMENT:    1.  Anxiety.  Moderate by BEAN-7 score today.  Symptoms are primarily related to work and the challenges of a rapid growth business.  We reviewed non-pharmacologic measures to manage stress, including scheduled intra-day, and of day, weekend, and vacation breaks from work; deep, diaphragmatic breathing; serial limb muscle contraction exercises (30 seconds per limb); adequate sleep and regular exercise.  I recommended that he read the book \"The Anxiety Toolkit\" and consider consultation with a performance  for counseling psychologist in the event that symptoms do not improve with these measures.    2.  Vitamin D deficiency.  I will recommended daily use of a 100 mcg vitamin D3 " "supplement for 1 month, followed by daily use of 50 mcg thereafter.    3.  Impaired fasting glucose.  Marginally elevated glucose.  We reviewed the implications of this diagnosis, along with importance of maintenance of ideal weight, and adherence to a healthful diet, and regular exercise.    4.  Preventive care.  Recent colonoscopy.  Immunization status is up-to-date.  Estimated 10-year risk of a vascular event is 0.9% using AHA/ACC guidelines for a 40-year-old (optimal for that cohort is 0.6%).  In addition to vitamin D3, as noted above, I recommended daily calcium intake of 1200 mg, preferably from dietary sources.  We reviewed the debate regarding the benefit of multivitamin supplements and the importance of selecting a product that does not contain iron.  We reviewed guidelines for \"safe\" use of alcohol.  He was advised to avoid all use of tobacco and we reviewed strategies for successful tobacco cessation.  We reviewed the elements of a healthful diet and the roles of various types of exercise, including high-intensity interval training, aerobic exercise, strength training, and subthreshold exercise.     PLAN: See above.     ~SRT        Mandeep Lees MD      "

## 2022-03-08 NOTE — PROGRESS NOTES
AUDIOLOGY REPORT  [facility]    SUMMARY: Audiology visit completed. See audiogram for results.      RECOMMENDATIONS: Follow-up if new symptoms arise.        Tanya Pryor., [facility]  Licensed Audiologist  [place] #[id number]

## 2022-03-08 NOTE — PROGRESS NOTES
El Beckett comes into clinic today at the request of Dr. KINGSLEY Lees Ordering Provider for EKG.    This service provided today was under the supervising provider of the day Dr. KINGSLEY Lees, who was available if needed.    Faith Ricketts, [facility]

## 2022-03-08 NOTE — LETTER
3/8/2022       RE: El Beckett  [address]  [place] [address]     Dear Colleague,    Thank you for referring your patient, El Beckett, to the [facility]. Please see a copy of my visit note below.    Physical Fitness Assessment:    See Fitness Action Plan for information.    Emanuel Soriano, PhD.  Exercise Physiologist  Fitness       Again, thank you for allowing me to participate in the care of your patient.      Sincerely,    Emanuel Soriano, PhD

## 2022-03-08 NOTE — PROGRESS NOTES
HPI  El Beckett is a 37 year old male here for comprehensive eye exam.  Intermittent stye KATHRYN on and off and has resolved on its own. Occasional itching of lids the past few days.  Distance not as crisp with each eye as when he was younger but feels vision is still good both distance and near each eye.     PMH: none  POH: no glasses, no surgery, no trauma  Oc Meds: none  FH: Denies any glaucoma, age related macular degeneration, or other known eye diseases         Assessment & Plan        (H52.13) Myopia, bilateral - Both Eyes (primary encounter diagnosis)  Comment: trace refractive error, no prescription glasses needed  Plan: Refraction done and on file, call with changes in vision       -----------------------------------------------------------------------------------       Patient disposition:   Return in about 2 years (around 3/8/2024) for Comprehensive Exam. Patient to call sooner as needed.    Complete documentation of historical and exam elements from today's encounter can be found in the full encounter summary report (not reduplicated in this progress note). I personally obtained the chief complaint(s) and history of present illness.  I have confirmed and edited as necessary the CC, HPI, PMH/PSH, social history, FMH, ROS, and exam/neuro findings as obtained by the technician or others. I have examined this patient myself and I personally viewed the image(s) and studies listed above and the documentation reflects my findings and interpretation.     Melody Sewell MD

## 2022-03-08 NOTE — LETTER
3/8/2022       RE: El Beckett  [address]  [place] [address]     Dear Colleague,    Thank you for referring your patient, El Beckett, to the [facility] [place] at [facility]. Please see a copy of my visit note below.    [facility] Dermatology Note  Encounter Date: Mar 8, 2022  Office Visit     Dermatology Problem List:  # NUB, R paraspinal back, ddx atypical nevus r/o melanoma, s/p shave bx 3/8/11  ____________________________________________    Assessment & Plan:    # NUB, R paraspinal back, ddx atypical nevus r/o melanoma  - Shave biopsy today, see procedure note below.     # Benign lesions: Multiple benign nevi, solar lentigos, seborrheic keratoses, cherry angiomas. Explained to patient benign nature of lesion. No treatment is necessary at this time unless the lesion changes or becomes symptomatic.     - ABCDs of melanoma were discussed and self skin checks were advised.  - Sun precaution was advised including the use of sun screens of SPF 30 or higher, sun protective clothing, and avoidance of tanning beds.    Procedures Performed:   - Shave biopsy procedure note, location(s): See above. After discussion of benefits and risks including but not limited to bleeding, infection, scar, incomplete removal, recurrence, and non-diagnostic biopsy, written consent and photographs were obtained. The area was cleaned with isopropyl alcohol. 0.5mL of 1% lidocaine with epinephrine was injected to obtain adequate anesthesia of lesion(s). Shave biopsy at site(s) performed. Hemostasis was achieved with aluminium chloride. Petrolatum ointment and a sterile dressing were applied. The patient tolerated the procedure and no complications were noted. The patient was provided with verbal and written post care instructions.     Follow-up: pending path results    Staff and Scribe:     Scribe Disclosure:  Millicent GUILLORY am  serving as a scribe to document services personally performed by Alex Maciel MD based on data collection and the provider's statements to me.     Provider Disclosure:   The documentation recorded by the scribe accurately reflects the services I personally performed and the decisions made by me.    Alex Maciel MD    Department of Dermatology  [facility]: Phone: [phone], Fax:[phone]  [facility]: Phone: [phone] Fax: [phone]    ____________________________________________    CC: Skin Check (full body skin check )    HPI:  Mr. El Beckett is a(n) 37 year old male who presents today as a new patient for [facility].     The patient otherwise denies any new or concerning lesions. No bleeding, painful, pruritic, or changing lesions. He's had a few moles removed from the back before, but none were concerning. They report no personal history of skin cancer or atypical nevi. There is no family history of skin cancer or atypical nevi. No history of immunosuppression. No history of indoor tanning. They do use sunscreen and protective clothing when outdoors for sun protection. In the sun he will both burn and tan. No occupational exposure to ultraviolet light or other forms of radiation. Patient works indoors at an office job. Health otherwise stable. No other skin concerns.     Labs Reviewed:  N/A    Physical Exam:  Vitals: There were no vitals taken for this visit.  SKIN: Full skin, which includes the head/face, both arms, chest, back, abdomen,both legs, genitalia and/or groin buttocks, digits and/or nails, was examined.  - On the R paraspinal back, there is a 4 mm brown macules with asymmetric disorganized reticular network with speckled pigment forming rhomboidal structures.   - There are dome shaped bright red papules on the trunk and extremities.   - Multiple regular brown  pigmented macules and papules are identified on the trunk and extremities.   - Scattered brown macules on sun exposed areas.  - There are waxy stuck on tan to brown papules on the trunk and extremities.   - No other lesions of concern on areas examined.         Medications:  Current Outpatient Medications   Medication     acetaminophen (TYLENOL) 325 MG tablet     No current facility-administered medications for this visit.      Past Medical History:   Patient Active Problem List   Diagnosis     Inguinal Hernia     Ulcerative Proctitis     No past medical history on file.         Again, thank you for allowing me to participate in the care of your patient.      Sincerely,    Alex Maciel MD

## 2022-03-08 NOTE — PATIENT INSTRUCTIONS
"Scott Gallegos,    Great working with you today. I hope you enjoyed the rest of your visit with us! So like we talked about, while exercise is important, it's almost more important that you start engaging in more regular physical activity each day.     Daily Physical Activity (PA) is just as important as exercise, but as with anything, to make it part of your lifestyle, you need to start slow and incorporate it gradually. Start by adding 500steps each day for a week (so your daily average from 3,500 goes up to 4,000 each day for a full week or two. Then add 500 more each day so your weekly average is at 4,500 steps/day. And so on and so forth until you are gradually adding more and more physical activity throughout your day until you reach closer to 10K steps on average as our daily physical activity recommendations are 10K steps.     Exercise recommendations for adults are 150 minutes of moderate intensity (conversation pace) OR 75 minutes of vigorous intensity (broken conversation) cardiorespiratory exercise each week. Or you can do a combination of both moderate and vigorous cardio, as long as you re challenging your cardiorespiratory system (elevated heart rate, breathing rate, sweat, exertion, etc.). Drastic lifestyle changes do not work, so again, you want to slowly build this into your routine so that it just becomes a part of your routine.     Tips/strategies for initiating and maintaining a PA/Exercise habit:    1) Build PA into your daily schedule: put it in your daily/weekly planner. Whether you write it down into a date book or use your phone calendar, set appointments with yourself to MOVE. \"Meetings with myself\" show that you're prioritizing your time for YOU! If you schedule it, you're more likely to do it.  2) You can also 'trick' yourself by packing a workout bag the night before a day you know you're going to the gym or going on a walk with friends after work - shoes, water bottle, warm layers when it's " cold, snack, etc.   3) Also, utilize the tools you can have access to - a pedometer or fitbit to help you track your progress.   4) Utilize your social network: either in-person or online, you have a network of people around you who should support your positive lifestyle changes. This also helps keep you accountable! If you have the capacity of sharing these lifestyle changes with people you care about/people who care about you, you're more likely to do them! Get your friends involved, your wife, your kids, or even complete strangers (activity clubs, [employer] sports, a , etc.).   5) Finally, be kind to yourself. It's about balance and progress. Progress is still progress no matter how slow you go. If you 'fall off the wagon', there's nothing to say you can't get right back on. Show up for yourself! :)    I hope you are able to slowly incorporate more PA into your lifestyle- the benefits are practically endless.    Have a great rest of your day and please let me know if you have any questions,    Emanuel Soriano, PhD

## 2022-03-08 NOTE — PROGRESS NOTES
Physical Fitness Assessment:    See Fitness Action Plan for information.    Emanuel Soriano, PhD.  Exercise Physiologist  Fitness

## 2022-03-08 NOTE — LETTER
"3/8/2022     RE: El Beckett  [address]  [place] [address]     Dear Colleague,    Thank you for referring your patient, El Beckett, to the [facility] at [facility]. Please see a copy of my visit note below.     History and Physical Examination     SUBJECTIVE: Chief concern: preventive health review.     Past Medical History:  1.  Ulcerative colitis; presented in 3/2014 with rectal bleeding and fecal urgency; colonoscopy that time showed ulcerative proctitis with cecal patch; intermittent flares thereafter, initially treated with mesalamine suppositories; no flares since changing to an organic, non-processed diet; 12/2021.  Colonoscopy showed single aphthous erosion of the ileum with decreased rectal vascularity, external hemorrhoids, pedunculated rectal polyp, and sessile polyp of the descending colon; diagnosed as \"ulcerative colitis limited to rectum and not active\".  2.  Status post bilateral inguinal herniorrhaphy.  3.  Nasal fracture, age 1     Adverse Drug Reactions: Amoxicillin (urticaria)     Current Medications: Acetaminophen, as needed     Habits:  Tobacco: Chewing tobacco, less than 1 tin per week  Alcohol: 1-2 servings per day  Caffeine: 2-3 servings of coffee per day  Street drugs: Infrequent use of marijuana     Social History:  to Amna in 2011 and father of 2 children: son Chicho, age 8, a second grader; and daughter Sharri, age 4.  El was born in [place] and moved to [place], [place] before high school.  He played soccer through college, attending the emergency [employer], where he studied marketing.  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  Away from work, he enjoys family activities, fishing, " hunting, and golf.  He does not formally exercise at this time.    Family History:  Mother is overweight in her upper 60s.  Father is in his late 60s, with history of colonic polyps and possible inflammatory bowel disease.  A brother 9 years his senior has type 2 diabetes mellitus and history of viral encephalitis and alcohol misuse.  A brother 6 years his senior has a history of drug and alcohol abuse.  A brother one year his senior is in good health.  A brother 5 years his waqar is overweight.  Children are in good health.  Paternal grandfather may have had colon cancer and  from renal and hepatic failure with history of alcohol abuse.  Paternal grandmother  in her 90s, with history of tobacco-related COPD.  Maternal grandfather  in his 70s; medical history not known.  Maternal grandmother  at age 96, with history of bladder cancer and tobacco abuse.      Review of Systems: Significant stress related to the rapid growth of his company and recent decisions to update systems software and increase hiring.  Colonoscopy was completed in 2021.  Covid vaccinations were administered on , , and 2021.  Most recent tetanus (Tdap) vaccination was administered in 2021.  Hepatitis B vaccination series was completed in 3/2002.  Remainder of complete review of systems was negative.     OBJECTIVE:     Vital signs: Height 73.7 inches.  Weight 181 pounds.  Blood pressure 111/72 on average of 3 automated readings.  Heart rate 87.  Respiratory rate 16.  Temperature 98.3 degrees.  O2 saturation 97% on room air.  General: Alert, neatly dressed and groomed, in no acute distress.  HEENT: Atraumatic and normocephalic. Eyelids, pupils, and conjunctivae appeared normal. Lips, teeth and gums appear normal.  Oropharynx showed moist mucous membranes, without exudate or erythema.  Neck: Supple, without thyromegaly, mass, or bruit. No cervical or supraclavicular lymphadenopathy.  Back: No spinal or  "costovertebral angle tenderness.  Chest: Clear to auscultation and percussion. Normal respiratory effort.  Cardiovascular: No jugular venous distention. Regular rate and rhythm, normal S1, S2 without murmur.  Abdomen: Bowel sounds positive; soft, nontender, without rebound, guarding, hepatosplenomegaly or mass.  Extremities: No cyanosis or edema.  Genitalia: Normal male genitalia, without scrotal mass or hernia. No inguinal lymphadenopathy.  Skin: Examination was deferred; full evaluation was completed.  Later in day through dermatology clinic.  Neurologic: Cranial nerves II-XII were grossly intact. Sensory and motor examinations were normal. Normal gait.  Mini-cog score was 5/5.  Psychiatric: Alert and oriented ×3. Normal affect. Judgment and insight intact.  BEAN-7 score was 12.  PHQ-2 score was 1.    Creatinine 0.94, alkaline phosphatase 52, ALT 30, cholesterol 187, HDL 44, , triglycerides 96, cholesterol/HDL 4.0, TSH 1.46, 25-hydroxyvitamin D 18, glucose 100, white blood cell count 6500, hemoglobin 15.3, platelets 305,000, hepatitis C and HIV screen was nonreactive.    EKG was notable only for changes of early repolarization.  Spirometry showed an FEV1 of 4.98, with an FVC of 6.96; readings were 104% and 116% of predicted values, respectively.    DEXA showed normal bone density, with most negative and valid Z-score of -0.3 at the level of the left femoral neck.  Body composition analysis showed 19.0% fat (36th percentile); body mass index was 23.4.     ASSESSMENT:    1.  Anxiety.  Moderate by BEAN-7 score today.  Symptoms are primarily related to work and the challenges of a rapid growth business.  We reviewed non-pharmacologic measures to manage stress, including scheduled intra-day, and of day, weekend, and vacation breaks from work; deep, diaphragmatic breathing; serial limb muscle contraction exercises (30 seconds per limb); adequate sleep and regular exercise.  I recommended that he read the book \"The " "Anxiety Toolkit\" and consider consultation with a performance  for counseling psychologist in the event that symptoms do not improve with these measures.    2.  Vitamin D deficiency.  I will recommended daily use of a 100 mcg vitamin D3 supplement for 1 month, followed by daily use of 50 mcg thereafter.    3.  Impaired fasting glucose.  Marginally elevated glucose.  We reviewed the implications of this diagnosis, along with importance of maintenance of ideal weight, and adherence to a healthful diet, and regular exercise.    4.  Preventive care.  Recent colonoscopy.  Immunization status is up-to-date.  Estimated 10-year risk of a vascular event is 0.9% using AHA/ACC guidelines for a 40-year-old (optimal for that cohort is 0.6%).  In addition to vitamin D3, as noted above, I recommended daily calcium intake of 1200 mg, preferably from dietary sources.  We reviewed the debate regarding the benefit of multivitamin supplements and the importance of selecting a product that does not contain iron.  We reviewed guidelines for \"safe\" use of alcohol.  He was advised to avoid all use of tobacco and we reviewed strategies for successful tobacco cessation.  We reviewed the elements of a healthful diet and the roles of various types of exercise, including high-intensity interval training, aerobic exercise, strength training, and subthreshold exercise.     PLAN: See above.     ~SRT        Again, thank you for allowing me to participate in the care of your patient.      Sincerely,    Mandeep Lees MD      "

## 2022-03-09 LAB
ATRIAL RATE - MUSE: 72 BPM
DIASTOLIC BLOOD PRESSURE - MUSE: NORMAL MMHG
EXPTIME-PRE: 6.92 SEC
FEF2575-%PRED-POST: 86 %
FEF2575-%PRED-PRE: 73 %
FEF2575-POST: 3.94 L/SEC
FEF2575-PRE: 3.36 L/SEC
FEF2575-PRED: 4.56 L/SEC
FEFMAX-%PRED-PRE: 120 %
FEFMAX-PRE: 13.25 L/SEC
FEFMAX-PRED: 11.02 L/SEC
FEV1-%PRED-PRE: 104 %
FEV1-PRE: 4.98 L
FEV1FEV6-PRE: 71 %
FEV1FEV6-PRED: 82 %
FEV1FVC-PRE: 72 %
FEV1FVC-PRED: 81 %
FIFMAX-PRE: 10.54 L/SEC
FVC-%PRED-PRE: 116 %
FVC-PRE: 6.96 L
FVC-PRED: 5.96 L
INTERPRETATION ECG - MUSE: NORMAL
P AXIS - MUSE: 75 DEGREES
PR INTERVAL - MUSE: 168 MS
QRS DURATION - MUSE: 82 MS
QT - MUSE: 358 MS
QTC - MUSE: 392 MS
R AXIS - MUSE: 75 DEGREES
SYSTOLIC BLOOD PRESSURE - MUSE: NORMAL MMHG
T AXIS - MUSE: 65 DEGREES
VENTRICULAR RATE- MUSE: 72 BPM

## 2022-03-09 NOTE — PROGRESS NOTES
" History and Physical Examination     SUBJECTIVE: Chief concern: preventive health review.     Past Medical History:  1.  Ulcerative colitis; presented in 3/2014 with rectal bleeding and fecal urgency; colonoscopy that time showed ulcerative proctitis with cecal patch; intermittent flares thereafter, initially treated with mesalamine suppositories; no flares since changing to an organic, non-processed diet; 12/2021.  Colonoscopy showed single aphthous erosion of the ileum with decreased rectal vascularity, external hemorrhoids, pedunculated rectal polyp, and sessile polyp of the descending colon; diagnosed as \"ulcerative colitis limited to rectum and not active\".  2.  Status post bilateral inguinal herniorrhaphy.  3.  Nasal fracture, age 1     Adverse Drug Reactions: Amoxicillin (urticaria)     Current Medications: Acetaminophen, as needed     Habits:  Tobacco: Chewing tobacco, less than 1 tin per week  Alcohol: 1-2 servings per day  Caffeine: 2-3 servings of coffee per day  Street drugs: Infrequent use of marijuana     Social History:  to Amna in 2011 and father of 2 children: son Chicho, age 8, a second grader; and daughter Sharri, age 4.  El was born in [place] and moved to [place], [place] before high school.  He played soccer through college, attending the [employer], where he studied marketing.  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  Away from work, he enjoys family activities, fishing, hunting, and golf.  He does not formally exercise at this time.    Family History:  Mother is overweight in her upper 60s.  Father is in his late 60s, with history of colonic polyps and possible inflammatory bowel disease.  A brother 9 years his senior has type 2 diabetes mellitus and history of viral " encephalitis and alcohol misuse.  A brother 6 years his senior has a history of drug and alcohol abuse.  A brother one year his senior is in good health.  A brother 5 years his waqar is overweight.  Children are in good health.  Paternal grandfather may have had colon cancer and  from renal and hepatic failure with history of alcohol abuse.  Paternal grandmother  in her 90s, with history of tobacco-related COPD.  Maternal grandfather  in his 70s; medical history not known.  Maternal grandmother  at age 96, with history of bladder cancer and tobacco abuse.      Review of Systems: Significant stress related to the rapid growth of his company and recent decisions to update systems software and increase hiring.  Colonoscopy was completed in 2021.  Covid vaccinations were administered on , , and 2021.  Most recent tetanus (Tdap) vaccination was administered in 2021.  Hepatitis B vaccination series was completed in 3/2002.  Remainder of complete review of systems was negative.     OBJECTIVE:     Vital signs: Height 73.7 inches.  Weight 181 pounds.  Blood pressure 111/72 on average of 3 automated readings.  Heart rate 87.  Respiratory rate 16.  Temperature 98.3 degrees.  O2 saturation 97% on room air.  General: Alert, neatly dressed and groomed, in no acute distress.  HEENT: Atraumatic and normocephalic. Eyelids, pupils, and conjunctivae appeared normal. Lips, teeth and gums appear normal.  Oropharynx showed moist mucous membranes, without exudate or erythema.  Neck: Supple, without thyromegaly, mass, or bruit. No cervical or supraclavicular lymphadenopathy.  Back: No spinal or costovertebral angle tenderness.  Chest: Clear to auscultation and percussion. Normal respiratory effort.  Cardiovascular: No jugular venous distention. Regular rate and rhythm, normal S1, S2 without murmur.  Abdomen: Bowel sounds positive; soft, nontender, without rebound, guarding, hepatosplenomegaly or  "mass.  Extremities: No cyanosis or edema.  Genitalia: Normal male genitalia, without scrotal mass or hernia. No inguinal lymphadenopathy.  Skin: Examination was deferred; full evaluation was completed.  Later in day through dermatology clinic.  Neurologic: Cranial nerves II-XII were grossly intact. Sensory and motor examinations were normal. Normal gait.  Mini-cog score was 5/5.  Psychiatric: Alert and oriented ×3. Normal affect. Judgment and insight intact.  BEAN-7 score was 12.  PHQ-2 score was 1.    Creatinine 0.94, alkaline phosphatase 52, ALT 30, cholesterol 187, HDL 44, , triglycerides 96, cholesterol/HDL 4.0, TSH 1.46, 25-hydroxyvitamin D 18, glucose 100, white blood cell count 6500, hemoglobin 15.3, platelets 305,000, hepatitis C and HIV screen was nonreactive.    EKG was notable only for changes of early repolarization.  Spirometry showed an FEV1 of 4.98, with an FVC of 6.96; readings were 104% and 116% of predicted values, respectively.    DEXA showed normal bone density, with most negative and valid Z-score of -0.3 at the level of the left femoral neck.  Body composition analysis showed 19.0% fat (36th percentile); body mass index was 23.4.     ASSESSMENT:    1.  Anxiety.  Moderate by BEAN-7 score today.  Symptoms are primarily related to work and the challenges of a rapid growth business.  We reviewed non-pharmacologic measures to manage stress, including scheduled intra-day, and of day, weekend, and vacation breaks from work; deep, diaphragmatic breathing; serial limb muscle contraction exercises (30 seconds per limb); adequate sleep and regular exercise.  I recommended that he read the book \"The Anxiety Toolkit\" and consider consultation with a performance  for counseling psychologist in the event that symptoms do not improve with these measures.    2.  Vitamin D deficiency.  I will recommended daily use of a 100 mcg vitamin D3 supplement for 1 month, followed by daily use of 50 mcg " "thereafter.    3.  Impaired fasting glucose.  Marginally elevated glucose.  We reviewed the implications of this diagnosis, along with importance of maintenance of ideal weight, and adherence to a healthful diet, and regular exercise.    4.  Preventive care.  Recent colonoscopy.  Immunization status is up-to-date.  Estimated 10-year risk of a vascular event is 0.9% using AHA/ACC guidelines for a 40-year-old (optimal for that cohort is 0.6%).  In addition to vitamin D3, as noted above, I recommended daily calcium intake of 1200 mg, preferably from dietary sources.  We reviewed the debate regarding the benefit of multivitamin supplements and the importance of selecting a product that does not contain iron.  We reviewed guidelines for \"safe\" use of alcohol.  He was advised to avoid all use of tobacco and we reviewed strategies for successful tobacco cessation.  We reviewed the elements of a healthful diet and the roles of various types of exercise, including high-intensity interval training, aerobic exercise, strength training, and subthreshold exercise.     PLAN: See above.     ~SRT    "

## 2022-03-10 LAB
PATH REPORT.COMMENTS IMP SPEC: NORMAL
PATH REPORT.COMMENTS IMP SPEC: NORMAL
PATH REPORT.FINAL DX SPEC: NORMAL
PATH REPORT.GROSS SPEC: NORMAL
PATH REPORT.MICROSCOPIC SPEC OTHER STN: NORMAL
PATH REPORT.RELEVANT HX SPEC: NORMAL

## 2022-03-10 NOTE — RESULT ENCOUNTER NOTE
Can you place 1-year recall? Thanks!    Alex Maciel MD  Pronouns: he/him/his    Department of Dermatology  [facility]: Phone: [phone], Fax:[phone]  [facility]: Phone: [phone] Fax: [phone]

## 2022-09-25 ENCOUNTER — HEALTH MAINTENANCE LETTER (OUTPATIENT)
Age: 37
End: 2022-09-25

## 2023-05-08 ENCOUNTER — HEALTH MAINTENANCE LETTER (OUTPATIENT)
Age: 38
End: 2023-05-08

## 2024-03-06 ENCOUNTER — OFFICE VISIT (OUTPATIENT)
Dept: FAMILY MEDICINE | Facility: CLINIC | Age: 39
End: 2024-03-06
Payer: COMMERCIAL

## 2024-03-06 VITALS
WEIGHT: 196.5 LBS | OXYGEN SATURATION: 97 % | RESPIRATION RATE: 16 BRPM | SYSTOLIC BLOOD PRESSURE: 111 MMHG | HEART RATE: 83 BPM | HEIGHT: 73 IN | BODY MASS INDEX: 26.04 KG/M2 | TEMPERATURE: 98 F | DIASTOLIC BLOOD PRESSURE: 71 MMHG

## 2024-03-06 DIAGNOSIS — J01.90 ACUTE SINUSITIS WITH SYMPTOMS > 10 DAYS: Primary | ICD-10-CM

## 2024-03-06 PROCEDURE — 99213 OFFICE O/P EST LOW 20 MIN: CPT

## 2024-03-06 RX ORDER — DOXYCYCLINE 100 MG/1
100 CAPSULE ORAL 2 TIMES DAILY
Qty: 20 CAPSULE | Refills: 0 | Status: SHIPPED | OUTPATIENT
Start: 2024-03-06 | End: 2024-03-16

## 2024-03-06 RX ORDER — BENZONATATE 100 MG/1
100 CAPSULE ORAL 3 TIMES DAILY PRN
Qty: 30 CAPSULE | Refills: 0 | Status: SHIPPED | OUTPATIENT
Start: 2024-03-06 | End: 2024-03-16

## 2024-03-06 ASSESSMENT — PAIN SCALES - GENERAL: PAINLEVEL: NO PAIN (0)

## 2024-03-06 NOTE — PATIENT INSTRUCTIONS
Your symptoms are most likely related to a sinus infection.  The majority of sinus infections are caused by viral illness, but considering how long you have been sick, it may be more likely that your illness is being caused by a bacteria.     I prescribed you course of antibiotics that you will take twice a day for 10 days.  Is very important that you take the entire course of the medication regardless of symptom improvement.  This medication can sometimes cause stomach upset, so taking it with food can help to prevent this.    I have also prescribed a medication called Tessalon Perels. This is a cough medication that can be useful to prevent coughing jags overnight, and help you get a good nights rest. This medication can be used during the day, but it can often make people a bit drowsy, so I would encourage you to take it in the evening for the first time to determine how your body reacts to the medication.      Sinus infections can be extremely uncomfortable, and there are things that we can do to relieve some of your symptoms.    Saline spray: I would like you to  with saline spray over-the-counter.  This works best when you lean slightly forward, insert the spray nozzle into your nostril, and direct the nozzle towards the opposite side of your face.  This is sometimes easier to be done in the shower over the sink as it can get a little bit messy.  You will know that you have done this correctly if your eyes slightly water after spraying the solution.  You can do this as prescribed on the box for as long as it takes to treat your symptoms.    Ibuprofen and Tylenol: You can take ibuprofen and Tylenol as prescribed on the box around the clock.  You can either take them on alternating schedules or you can take them together.  These medications work differently in your body, and are safe to be taken together.    Humidifier: Using a humidifier in the area that you sleep or taking a very hot shower to inhale  some of the vaporized steam can help to open up your nasal passages and sinuses and encourage them to drain.    Rest: get adequate rest including 7-8 hours of sleep, and low activity levels during the day to encourage healing. Avoid high impact exercise and rigorous physical labor    Nutrition: support healing by fueling your body with healthy foods. Fruits, vegetables, and whole foods are all great options!    Hydration: increase fluid intake. Illness leads to increased dehydration, so your body needs more fluid intake than it might when you are healthy. [place]addell and sugar free teas are great options. Try to avoid beverages that cause more dehydration including coffee, soda, energy drinks, and alcohol.     As we discussed, if your symptoms do not improve or worsen over the next week, please follow-up so that we can start to discuss different options to help you feel better.    It is important to seek immediate medical attention if you are having symptoms of chest pain, fever, shortness of breath, palpitations, or any changes in your mental status.

## 2024-03-06 NOTE — PROGRESS NOTES
Assessment & Plan     (J01.90) Acute sinusitis with symptoms > 10 days  (primary encounter diagnosis)  Comment: Acute and worsening. No signs of respiratory distress, vital signs stable, and no red flag signs requiring immediate need for medical attention.  Patient nontoxic-appearing.  Differential diagnoses include viral versus bacterial etiology.  Considering worsening severity of symptoms as time goes on, and illness for more than 10 days.  With significant sinus pressure and pain, increased mucus production, and increased malaise, more likely to believe that patient's symptoms are likely related to a bacterial infection that would benefit from antibiotics.  Will dose patient twice daily for 10 days with doxycycline considering that he has an allergy to amoxicillin.  Also prescribed Tessalon Perles for postnasal drip symptom management at night.  If patient does not improve after 7 to 10 days of treatment, we will likely consider either changing antibiotic coverage, obtaining imaging of his sinus symptoms, or considering a etiologies of his symptoms.  Plan: benzonatate (TESSALON) 100 MG capsule,         doxycycline hyclate (VIBRAMYCIN) 100 MG capsule      Prescription drug management  I spent a total of 15 minutes on the day of the visit.   Time spent by me doing chart review, history and exam, documentation and further activities per the note    FUTURE APPOINTMENTS:       - Follow-up visit in 1 week if symptoms worsen or do not improve       - Follow-up for annual visit or as needed  Patient Instructions   Your symptoms are most likely related to a sinus infection.  The majority of sinus infections are caused by viral illness, but considering how long you have been sick, it may be more likely that your illness is being caused by a bacteria.     I prescribed you course of antibiotics that you will take twice a day for 10 days.  Is very important that you take the entire course of the medication regardless of  symptom improvement.  This medication can sometimes cause stomach upset, so taking it with food can help to prevent this.    I have also prescribed a medication called Tessalon Perels. This is a cough medication that can be useful to prevent coughing jags overnight, and help you get a good nights rest. This medication can be used during the day, but it can often make people a bit drowsy, so I would encourage you to take it in the evening for the first time to determine how your body reacts to the medication.      Sinus infections can be extremely uncomfortable, and there are things that we can do to relieve some of your symptoms.    Saline spray: I would like you to  with saline spray over-the-counter.  This works best when you lean slightly forward, insert the spray nozzle into your nostril, and direct the nozzle towards the opposite side of your face.  This is sometimes easier to be done in the shower over the sink as it can get a little bit messy.  You will know that you have done this correctly if your eyes slightly water after spraying the solution.  You can do this as prescribed on the box for as long as it takes to treat your symptoms.    Ibuprofen and Tylenol: You can take ibuprofen and Tylenol as prescribed on the box around the clock.  You can either take them on alternating schedules or you can take them together.  These medications work differently in your body, and are safe to be taken together.    Humidifier: Using a humidifier in the area that you sleep or taking a very hot shower to inhale some of the vaporized steam can help to open up your nasal passages and sinuses and encourage them to drain.    Rest: get adequate rest including 7-8 hours of sleep, and low activity levels during the day to encourage healing. Avoid high impact exercise and rigorous physical labor    Nutrition: support healing by fueling your body with healthy foods. Fruits, vegetables, and whole foods are all great  options!    Hydration: increase fluid intake. Illness leads to increased dehydration, so your body needs more fluid intake than it might when you are healthy. Waddell and sugar free teas are great options. Try to avoid beverages that cause more dehydration including coffee, soda, energy drinks, and alcohol.     As we discussed, if your symptoms do not improve or worsen over the next week, please follow-up so that we can start to discuss different options to help you feel better.    It is important to seek immediate medical attention if you are having symptoms of chest pain, fever, shortness of breath, palpitations, or any changes in your mental status.     Subjective   El is a 39 year old, presenting for the following health issues:  office visit and Recheck Medication (Pt reports that he thinks that he has a sinus infection with very thick mucus and pressure in his face, coughing.)      3/6/2024     8:22 AM   Additional Questions   Roomed by ruth ann   Accompanied by alone         3/6/2024     8:22 AM   Patient Reported Additional Medications   Patient reports taking the following new medications none     History of Present Illness       Reason for visit:  Sinus infection  Symptom onset:  3-7 days ago    He eats 2-3 servings of fruits and vegetables daily.He consumes 1 sweetened beverage(s) daily.He exercises with enough effort to increase his heart rate 10 to 19 minutes per day.  He exercises with enough effort to increase his heart rate 3 or less days per week.   He is taking medications regularly.     El is a 39-year-old male with a past medical history significant for ulcerative proctitis and inguinal hernia who presents today for sinus symptoms.  Patient notes that about once yearly he gets an illness presents as a head cold, moves down to his chest, progresses to increased mucus production and a cough, and ends with a rash eruption on his chest and groin.  He notes that after the rash eruption occurs his  "symptoms generally completely resolved, and he does not often or every need to seek medical attention in the clinic to manage his concerns.  Patient reports that about 10 days ago he began having symptoms of sinus pressure, head congestion, ear fullness, and general fatigue.  He noted that in the coming days the symptoms progressed into his chest, accompanied by chest congestion, increased mucus production, and persistent cough as usual.  Patient notes that over the coming days his symptoms seems to largely worsen rather than improve.  He presents today with continued sinus pressure, nasal congestion, increased mucus production, chest congestion, persistent cough, ear pressure.  Patient declines that he has had a fever throughout illness, but does note that symptoms seem to be worsening rather than improving.  He declines any chest pain, significant shortness of breath, nausea, vomiting, diarrhea, ear pain, discharge from his ears, or sore throat.  He has tried very little in terms of over-the-counter treatment options.      Review of Systems  Constitutional, HEENT, cardiovascular, pulmonary, gi and gu systems are negative, except as otherwise noted.      Objective    /71 (BP Location: Left arm, Patient Position: Sitting, Cuff Size: Adult Large)   Pulse 83   Temp 98  F (36.7  C) (Tympanic)   Resp 16   Ht 1.854 m (6' 1\")   Wt 89.1 kg (196 lb 8 oz)   SpO2 97%   BMI 25.93 kg/m    Body mass index is 25.93 kg/m .  Physical Exam   GENERAL: alert and no distress  EYES: Eyes grossly normal to inspection, PERRL and conjunctivae and sclerae normal  HENT: normal cephalic/atraumatic, ear canals and TM's normal, nose and mouth without ulcers or lesions, oropharynx clear, oral mucous membranes moist, and sinuses: maxillary, frontal tenderness on bilaterally, maxillary, frontal swelling on bilaterally  NECK: no adenopathy, no asymmetry, masses, or scars  RESP: lungs clear to auscultation - no rales, rhonchi or " wheezes  CV: regular rate and rhythm, normal S1 S2, no S3 or S4, no murmur, click or rub, no peripheral edema  SKIN: no suspicious lesions or rashes    Genie Santana DNP FNP-C  Family Nurse Practitioner - Same Day Provider  [facility] - [place]          Signed Electronically by: JOHN Herrera CNP

## 2024-05-11 ENCOUNTER — HEALTH MAINTENANCE LETTER (OUTPATIENT)
Age: 39
End: 2024-05-11

## 2024-07-24 ENCOUNTER — TELEPHONE (OUTPATIENT)
Dept: URGENT CARE | Facility: URGENT CARE | Age: 39
End: 2024-07-24
Payer: COMMERCIAL

## 2024-07-24 NOTE — TELEPHONE ENCOUNTER
COVID Positive/Requesting COVID treatment    Patient is positive for COVID and requesting treatment options.    Date of positive COVID test (PCR or at home)? 7/24/2024  Current COVID symptoms: fever or chills and congestion or runny nose  Date COVID symptoms began: 7/23/2024    Message should be routed to clinic RN pool. Best phone number to use for call back: [phone].    Patient shared that he does NOT have a PCP, and typically just sees however is available at the [facility]. Writer offer to schedule patent for a Virtual visit with a provider, however, patient does NOT meet scheduling criteria under decision tree.     Writer explained to patient that if patient wishes to obtain Paxlovid, he can still go to Urgent Care to obtain it from a Provider, but writer is unable to prescribe Paxlovid based on current RN COVID TREATMENT PROTOCOL and scheduling decision tree. Patient verbalizes and has no further questions.    Len Duron, BSN, RN   [facility]

## 2025-01-22 ENCOUNTER — TELEPHONE (OUTPATIENT)
Dept: FAMILY MEDICINE | Facility: CLINIC | Age: 40
End: 2025-01-22
Payer: COMMERCIAL

## 2025-01-22 NOTE — TELEPHONE ENCOUNTER
New Medication Request        What medication are you requesting?: vitamin D3 1.25 MG (84842 UT) capsule, commonly known as: CHOLECALCIFEROL     Reason for medication request: low in vitamin D    Preferred Pharmacy:  [facility]/pharmacy #[id number] - [place], [place] - [address] [place] [address]  [address] [place] [address]  Phone: [phone] Fax: [phone]      Could we send this information to you in [employer] [facility] or would you prefer to receive a phone call?:   Patient would prefer a phone call     Okay to leave a detailed message?: N/A at Home number on file [phone] (home), or Cell number on file:    Telephone Information:   Mobile [phone]       Controlled Substance Agreement on file:   CSA -- Patient Level:    CSA: None found at the patient level.

## (undated) RX ORDER — ALBUTEROL SULFATE 0.83 MG/ML
SOLUTION RESPIRATORY (INHALATION)
Status: DISPENSED
Start: 2022-03-08